# Patient Record
Sex: FEMALE | Race: WHITE | Employment: FULL TIME | URBAN - METROPOLITAN AREA
[De-identification: names, ages, dates, MRNs, and addresses within clinical notes are randomized per-mention and may not be internally consistent; named-entity substitution may affect disease eponyms.]

---

## 2021-08-24 ENCOUNTER — HOSPITAL ENCOUNTER (EMERGENCY)
Age: 41
Discharge: ANOTHER HOSPITAL | End: 2021-08-24
Payer: COMMERCIAL

## 2021-08-24 ENCOUNTER — APPOINTMENT (OUTPATIENT)
Dept: GENERAL RADIOLOGY | Age: 41
End: 2021-08-24
Payer: COMMERCIAL

## 2021-08-24 ENCOUNTER — HOSPITAL ENCOUNTER (EMERGENCY)
Age: 41
Discharge: HOME | End: 2021-08-24
Payer: COMMERCIAL

## 2021-08-24 VITALS
BODY MASS INDEX: 18.61 KG/M2 | HEIGHT: 63 IN | RESPIRATION RATE: 14 BRPM | SYSTOLIC BLOOD PRESSURE: 100 MMHG | WEIGHT: 105 LBS | DIASTOLIC BLOOD PRESSURE: 67 MMHG | OXYGEN SATURATION: 96 % | HEART RATE: 90 BPM | TEMPERATURE: 100.2 F

## 2021-08-24 VITALS
BODY MASS INDEX: 18.61 KG/M2 | RESPIRATION RATE: 18 BRPM | SYSTOLIC BLOOD PRESSURE: 95 MMHG | HEART RATE: 87 BPM | DIASTOLIC BLOOD PRESSURE: 67 MMHG | WEIGHT: 105 LBS | TEMPERATURE: 98.5 F | HEIGHT: 63 IN | OXYGEN SATURATION: 98 %

## 2021-08-24 DIAGNOSIS — U07.1 COVID-19: Primary | ICD-10-CM

## 2021-08-24 DIAGNOSIS — B34.2 CORONAVIRUS INFECTION, UNSPECIFIED: Primary | ICD-10-CM

## 2021-08-24 DIAGNOSIS — R07.89 CHEST WALL PAIN: ICD-10-CM

## 2021-08-24 LAB
D DIMER: 212 NG/ML DDU
HCG, URINE, POC: NEGATIVE
Lab: NORMAL
NEGATIVE QC PASS/FAIL: NORMAL
POSITIVE QC PASS/FAIL: NORMAL
SARS-COV-2, NAAT: DETECTED

## 2021-08-24 PROCEDURE — 99282 EMERGENCY DEPT VISIT SF MDM: CPT

## 2021-08-24 PROCEDURE — 6370000000 HC RX 637 (ALT 250 FOR IP): Performed by: NURSE PRACTITIONER

## 2021-08-24 PROCEDURE — 71046 X-RAY EXAM CHEST 2 VIEWS: CPT

## 2021-08-24 PROCEDURE — 99211 OFF/OP EST MAY X REQ PHY/QHP: CPT

## 2021-08-24 PROCEDURE — 87635 SARS-COV-2 COVID-19 AMP PRB: CPT

## 2021-08-24 PROCEDURE — 85378 FIBRIN DEGRADE SEMIQUANT: CPT

## 2021-08-24 RX ORDER — ONDANSETRON 4 MG/1
4 TABLET, ORALLY DISINTEGRATING ORAL EVERY 8 HOURS PRN
Qty: 10 TABLET | Refills: 0 | Status: SHIPPED | OUTPATIENT
Start: 2021-08-24

## 2021-08-24 RX ORDER — ACETAMINOPHEN 325 MG/1
650 TABLET ORAL ONCE
Status: COMPLETED | OUTPATIENT
Start: 2021-08-24 | End: 2021-08-24

## 2021-08-24 RX ADMIN — ACETAMINOPHEN 650 MG: 325 TABLET ORAL at 18:42

## 2021-08-24 ASSESSMENT — PAIN DESCRIPTION - FREQUENCY
FREQUENCY: CONTINUOUS
FREQUENCY: INTERMITTENT

## 2021-08-24 ASSESSMENT — PAIN DESCRIPTION - PROGRESSION
CLINICAL_PROGRESSION: GRADUALLY WORSENING
CLINICAL_PROGRESSION: NOT CHANGED

## 2021-08-24 ASSESSMENT — PAIN DESCRIPTION - LOCATION
LOCATION: CHEST
LOCATION: BACK;KNEE

## 2021-08-24 ASSESSMENT — PAIN SCALES - GENERAL
PAINLEVEL_OUTOF10: 5
PAINLEVEL_OUTOF10: 6
PAINLEVEL_OUTOF10: 6

## 2021-08-24 ASSESSMENT — PAIN DESCRIPTION - PAIN TYPE: TYPE: ACUTE PAIN

## 2021-08-24 ASSESSMENT — PAIN DESCRIPTION - ONSET
ONSET: ON-GOING
ONSET: ON-GOING

## 2021-08-24 ASSESSMENT — PAIN DESCRIPTION - DESCRIPTORS
DESCRIPTORS: DISCOMFORT
DESCRIPTORS: ACHING;THROBBING

## 2021-08-24 ASSESSMENT — PAIN - FUNCTIONAL ASSESSMENT: PAIN_FUNCTIONAL_ASSESSMENT: ACTIVITIES ARE NOT PREVENTED

## 2021-08-24 ASSESSMENT — PAIN DESCRIPTION - ORIENTATION: ORIENTATION: LEFT

## 2021-08-24 NOTE — ED PROVIDER NOTES
HPI:  8/24/21,   Time: 6:53 PM EDT         Darci Benitez is a 45 y.o. female presenting to the ED for fatigue, chest pain, shortness of breath, cough and fever with body aches, beginning 1 week ago. The complaint has been persistent, moderate in severity, and worsened by nothing. She presents complaining of shortness of breath, chest pain, fatigue with body aches and fevers which is been present intermittently for the past week. States that her grandmother was diagnosed approximately 2 weeks ago with COVID-19 and she is been taking care of her at home. She states multiple family members have all been positive for Covid. ROS:   Pertinent positives and negatives are stated within HPI, all other systems reviewed and are negative.  --------------------------------------------- PAST HISTORY ---------------------------------------------  Past Medical History:  has no past medical history on file. Past Surgical History:  has no past surgical history on file. Social History:  reports that she has never smoked. She has never used smokeless tobacco. She reports that she does not drink alcohol and does not use drugs. Family History: family history is not on file. The patients home medications have been reviewed.     Allergies: Demerol hcl [meperidine] and Penicillins    -------------------------------------------------- RESULTS -------------------------------------------------  All laboratory and radiology results have been personally reviewed by myself   LABS:  Results for orders placed or performed during the hospital encounter of 08/24/21   COVID-19, Rapid    Specimen: Nasopharyngeal Swab   Result Value Ref Range    SARS-CoV-2, NAAT DETECTED (A) Not Detected       RADIOLOGY:  Interpreted by Radiologist.  XR CHEST (2 VW)   Final Result   No pneumonia or pleural effusion.             ------------------------- NURSING NOTES AND VITALS REVIEWED ---------------------------   The nursing notes within the ED encounter and vital signs as below have been reviewed. /67   Pulse 90   Temp 100.2 °F (37.9 °C) (Temporal)   Resp 14   Ht 5' 3\" (1.6 m)   Wt 105 lb (47.6 kg)   LMP 08/21/2021   SpO2 96%   BMI 18.60 kg/m²   Oxygen Saturation Interpretation: Normal      ---------------------------------------------------PHYSICAL EXAM--------------------------------------      Constitutional/General: Alert and oriented x3, well appearing, non toxic in NAD  Head: NC/AT  Eyes: PERRL, EOMI  Mouth: Oropharynx clear, handling secretions, no trismus  Neck: Supple, full ROM, no meningeal signs  Pulmonary: Lungs clear to auscultation bilaterally, no wheezes, rales, or rhonchi. Not in respiratory distress  Cardiovascular:  Regular rate and rhythm, no murmurs, gallops, or rubs. 2+ distal pulses  Abdomen: Soft, non tender, non distended,   Extremities: Moves all extremities x 4. Warm and well perfused  Skin: warm and dry without rash  Neurologic: GCS 15,  Psych: Normal Affect      ------------------------------ ED COURSE/MEDICAL DECISION MAKING----------------------  Medications   acetaminophen (TYLENOL) tablet 650 mg (650 mg Oral Given 8/24/21 1842)         Medical Decision Making:    Chest x-ray is negative for any presence of a pneumonia however her COVID-19 is positive. She does have a temperature of 100.2 Tylenol was given in the department. Based on her complaints of the chest pain and shortness of breath despite that her pulse oximetry is 96% I do recommend the patient go to the emergency room for further evaluation of her chest pain to rule out the presence of a pulmonary embolism. This was discussed with the patient at length and I did offer EMS transport however she declined stating that her family can take her over to the emergency room. Counseling:    The emergency provider has spoken with the patient and discussed todays results, in addition to providing specific details for the plan of care and counseling regarding the diagnosis and prognosis.   Questions are answered at this time and they are agreeable with the plan.      --------------------------------- IMPRESSION AND DISPOSITION ---------------------------------    IMPRESSION  1. COVID-19        DISPOSITION  Disposition: Discharge to go to the ER  Patient condition is good                 ADONIS Ag CNP  08/24/21 7212

## 2021-08-25 ENCOUNTER — CARE COORDINATION (OUTPATIENT)
Dept: CARE COORDINATION | Age: 41
End: 2021-08-25

## 2021-08-25 NOTE — ED PROVIDER NOTES
[facility]  Department of Emergency Medicine   ED  Encounter Note  Admit Date/RoomTime: 2021  9:28 PM  ED Room: [place] [address]  NAME: Tawnya Hale  : 1980  MRN: [id number]     Chief Complaint:  Chest Pain (Covid +, thought she is having some chest discomfort with breathing thought she had bronchitis, [facility] sent her here)    [id number] Alcazar is a 45 y.o. female who presents to the ED for additional testing after being coronavirus positive. Patient actually went to [id number] [place] [address] urgent care today with multiple complaints. Not feeling well. Due to chest pain. Pain in the chest when she takes a deep breath. Nauseated. No appetite. Patient was tested for the coronavirus and it came back positive to date. Patient also had a chest x-ray that showed no acute process. With her main concern being chest pain patient was sent here to the emergency room to rule out PE. Symptoms are moderate in severity. She is not taking any over-the-counter medications for this complaint. ROS   Pertinent positives and negatives are stated within HPI, all other systems reviewed and are negative. Past Medical History:  has no past medical history on file. Surgical History:  has no past surgical history on file. Social History:  reports that she has never smoked. She has never used smokeless tobacco. She reports that she does not drink alcohol and does not use drugs. Family History: family history is not on file. Allergies: Demerol hcl [meperidine] and Penicillins    PHYSICAL EXAM   Oxygen Saturation Interpretation: Normal on room air analysis. ED Triage Vitals   BP Temp Temp Source Pulse Resp SpO2 Height Weight   21 1908 21 19021 19021   95/67 98.5 °F (36.9 °C) Infrared 87 18 98 % 5' 3\" (1.6 m) 105 lb (47.6 kg)       General:  NAD. Alert and Oriented. Well-appearing. Skin:  Warm, dry. No rashes. Head:  Normocephalic. Atraumatic. Eyes:  EOMI. Conjunctiva normal.  ENT:  Oral mucosa moist.  Airway patent. Neck:  Supple. Normal ROM. Respiratory:  No respiratory distress. No labored breathing. Lungs clear without rales, rhonchi or wheezing. Cardiovascular:  Regular rate. No Murmur. No peripheral edema. Extremities warm and good color. Chest: Anterior superior chest wall, right in the center, is slightly tender to palpation. Does reproduce her complaints. Extremities:  Normal ROM. Nontender to palpation. Atraumatic. Back:  Normal ROM. Nontender to palpation. Neuro:  Alert and Oriented to person, place, time and situation. Normal LOC. Moves all extremities. Speech fluent. Psych:  Calm and Cooperative. Normal thought process. Normal judgement. Lab / Imaging Results   (All laboratory and radiology results have been personally reviewed by myself)  Labs:  Results for orders placed or performed during the hospital encounter of 08/24/21   D-Dimer, Quantitative   Result Value Ref Range    D-Dimer, Quant 212 ng/mL DDU   POC Pregnancy Urine   Result Value Ref Range    HCG, Urine, POC Negative Negative    Lot Number [id number]     Positive QC Pass/Fail Pass     Negative QC Pass/Fail Pass      Imaging: All Radiology results interpreted by Radiologist unless otherwise noted. No orders to display       ED Course / Medical Decision Making   Medications - No data to display     Re-examination:  8/24/21       Time:   Patients condition . Consult(s):   None    Procedure(s):   none    MDM:   Case discussed with Dr. Sandro Martinez regarding the D-dimer of 212. Patient is not hypoxic. Not tachycardic. That result can be viewed as negative.   Patient does not need a CT of her chest.    Plan of Care/Counseling:  Physician Assistant on duty reviewed today's visit with the patient in addition to providing specific details for the plan of care and counseling regarding the diagnosis and prognosis. Questions are answered at this time and are agreeable with the plan. ASSESSMENT     1. Coronavirus infection, unspecified New Problem   2. Chest wall pain New Problem     PLAN   Discharged home. Patient condition is good    New Medications     New Prescriptions    ONDANSETRON (ZOFRAN ODT) 4 MG DISINTEGRATING TABLET    Take 1 tablet by mouth every 8 hours as needed for Nausea or Vomiting     Electronically signed by BEVERLY Estrada   DD: 8/24/21  **This report was transcribed using voice recognition software. Every effort was made to ensure accuracy; however, inadvertent computerized transcription errors may be present. END OF ED PROVIDER NOTE       Alicia Zavala, [address]  08/24/21 2136      ATTENDING PROVIDER ATTESTATION:     Supervising Physician, on-site, available for consultation, non-participatory in the evaluation or care of this patient.          Rosemarie Ochoa DO  09/19/21 9436

## 2021-08-25 NOTE — ED TRIAGE NOTES
FIRST PROVIDER CONTACT ASSESSMENT NOTE      Department of Emergency Medicine   Admit Date: No admission date for patient encounter. Chief Complaint: Chest Pain (Covid +, thought she is having some chest discomfort with breathing thought she had bronchitis, [facility] sent her here)      History of Present Illness:    Tona Webster is a 45 y.o. female who presents to the ED for chest pain with coronavirus. Patient was just at urgent care today for an upper respiratory infection. She came back positive for coronavirus. Chest x-ray was negative for acute process.   Complaining of chest pain to the center of her chest.  Is sent over to rule out PE.        -----------------END OF FIRST PROVIDER CONTACT ASSESSMENT NOTE--------------  Electronically signed by BEVERLY Fabian   DD: 8/24/21

## 2021-08-26 ENCOUNTER — CARE COORDINATION (OUTPATIENT)
Dept: CARE COORDINATION | Age: 41
End: 2021-08-26

## 2021-08-26 NOTE — CARE COORDINATION
2nd attempt to follow up with pt regarding recent er visit.  Left hipaa compliant message updating pt to follow up with pcp regarding anything further will close this encounter